# Patient Record
Sex: MALE | Race: WHITE | NOT HISPANIC OR LATINO | URBAN - METROPOLITAN AREA
[De-identification: names, ages, dates, MRNs, and addresses within clinical notes are randomized per-mention and may not be internally consistent; named-entity substitution may affect disease eponyms.]

---

## 2019-07-31 ENCOUNTER — OFFICE VISIT - HEALTHEAST (OUTPATIENT)
Dept: INTERNAL MEDICINE | Facility: CLINIC | Age: 24
End: 2019-07-31

## 2019-07-31 DIAGNOSIS — Z00.00 ANNUAL PHYSICAL EXAM: ICD-10-CM

## 2019-07-31 DIAGNOSIS — F32.A DEPRESSION, UNSPECIFIED DEPRESSION TYPE: ICD-10-CM

## 2019-07-31 DIAGNOSIS — F41.9 ANXIETY: ICD-10-CM

## 2019-07-31 DIAGNOSIS — Z11.3 SCREEN FOR STD (SEXUALLY TRANSMITTED DISEASE): ICD-10-CM

## 2019-07-31 DIAGNOSIS — R53.83 FATIGUE, UNSPECIFIED TYPE: ICD-10-CM

## 2019-07-31 LAB
ALBUMIN SERPL-MCNC: 4.5 G/DL (ref 3.5–5)
ALP SERPL-CCNC: 94 U/L (ref 45–120)
ALT SERPL W P-5'-P-CCNC: 26 U/L (ref 0–45)
ANION GAP SERPL CALCULATED.3IONS-SCNC: 9 MMOL/L (ref 5–18)
AST SERPL W P-5'-P-CCNC: 17 U/L (ref 0–40)
BASOPHILS # BLD AUTO: 0.1 THOU/UL (ref 0–0.2)
BASOPHILS NFR BLD AUTO: 1 % (ref 0–2)
BILIRUB SERPL-MCNC: 0.4 MG/DL (ref 0–1)
BUN SERPL-MCNC: 13 MG/DL (ref 8–22)
CALCIUM SERPL-MCNC: 9.7 MG/DL (ref 8.5–10.5)
CHLORIDE BLD-SCNC: 107 MMOL/L (ref 98–107)
CHOLEST SERPL-MCNC: 209 MG/DL
CO2 SERPL-SCNC: 25 MMOL/L (ref 22–31)
CREAT SERPL-MCNC: 0.83 MG/DL (ref 0.7–1.3)
EOSINOPHIL # BLD AUTO: 0.6 THOU/UL (ref 0–0.4)
EOSINOPHIL NFR BLD AUTO: 5 % (ref 0–6)
ERYTHROCYTE [DISTWIDTH] IN BLOOD BY AUTOMATED COUNT: 11.1 % (ref 11–14.5)
FASTING STATUS PATIENT QL REPORTED: NO
GFR SERPL CREATININE-BSD FRML MDRD: >60 ML/MIN/1.73M2
GLUCOSE BLD-MCNC: 84 MG/DL (ref 70–125)
HCT VFR BLD AUTO: 42.8 % (ref 40–54)
HDLC SERPL-MCNC: 52 MG/DL
HGB BLD-MCNC: 14.5 G/DL (ref 14–18)
LDLC SERPL CALC-MCNC: 126 MG/DL
LYMPHOCYTES # BLD AUTO: 3 THOU/UL (ref 0.8–4.4)
LYMPHOCYTES NFR BLD AUTO: 26 % (ref 20–40)
MCH RBC QN AUTO: 31 PG (ref 27–34)
MCHC RBC AUTO-ENTMCNC: 33.8 G/DL (ref 32–36)
MCV RBC AUTO: 92 FL (ref 80–100)
MONOCYTES # BLD AUTO: 1 THOU/UL (ref 0–0.9)
MONOCYTES NFR BLD AUTO: 9 % (ref 2–10)
NEUTROPHILS # BLD AUTO: 6.7 THOU/UL (ref 2–7.7)
NEUTROPHILS NFR BLD AUTO: 60 % (ref 50–70)
PLATELET # BLD AUTO: 305 THOU/UL (ref 140–440)
PMV BLD AUTO: 7 FL (ref 7–10)
POTASSIUM BLD-SCNC: 4 MMOL/L (ref 3.5–5)
PROT SERPL-MCNC: 7.2 G/DL (ref 6–8)
RBC # BLD AUTO: 4.67 MILL/UL (ref 4.4–6.2)
SODIUM SERPL-SCNC: 141 MMOL/L (ref 136–145)
TRIGL SERPL-MCNC: 157 MG/DL
TSH SERPL DL<=0.005 MIU/L-ACNC: 0.69 UIU/ML (ref 0.3–5)
WBC: 11.3 THOU/UL (ref 4–11)

## 2019-07-31 ASSESSMENT — MIFFLIN-ST. JEOR: SCORE: 1668.24

## 2019-08-14 ENCOUNTER — OFFICE VISIT - HEALTHEAST (OUTPATIENT)
Dept: INTERNAL MEDICINE | Facility: CLINIC | Age: 24
End: 2019-08-14

## 2019-08-14 DIAGNOSIS — F41.9 ANXIETY: ICD-10-CM

## 2019-08-14 DIAGNOSIS — F32.A DEPRESSION, UNSPECIFIED DEPRESSION TYPE: ICD-10-CM

## 2019-08-14 ASSESSMENT — MIFFLIN-ST. JEOR: SCORE: 1654.64

## 2019-09-10 ENCOUNTER — COMMUNICATION - HEALTHEAST (OUTPATIENT)
Dept: INTERNAL MEDICINE | Facility: CLINIC | Age: 24
End: 2019-09-10

## 2019-09-10 DIAGNOSIS — F41.9 ANXIETY: ICD-10-CM

## 2019-09-10 DIAGNOSIS — F32.A DEPRESSION, UNSPECIFIED DEPRESSION TYPE: ICD-10-CM

## 2019-09-11 ENCOUNTER — COMMUNICATION - HEALTHEAST (OUTPATIENT)
Dept: INTERNAL MEDICINE | Facility: CLINIC | Age: 24
End: 2019-09-11

## 2019-09-20 ASSESSMENT — ANXIETY QUESTIONNAIRES
4. TROUBLE RELAXING: NOT AT ALL
2. NOT BEING ABLE TO STOP OR CONTROL WORRYING: NOT AT ALL
GAD7 TOTAL SCORE: 3
5. BEING SO RESTLESS THAT IT IS HARD TO SIT STILL: NOT AT ALL
3. WORRYING TOO MUCH ABOUT DIFFERENT THINGS: SEVERAL DAYS
6. BECOMING EASILY ANNOYED OR IRRITABLE: SEVERAL DAYS
1. FEELING NERVOUS, ANXIOUS, OR ON EDGE: SEVERAL DAYS
7. FEELING AFRAID AS IF SOMETHING AWFUL MIGHT HAPPEN: NOT AT ALL

## 2019-09-20 ASSESSMENT — PATIENT HEALTH QUESTIONNAIRE - PHQ9: SUM OF ALL RESPONSES TO PHQ QUESTIONS 1-9: 2

## 2020-10-20 ENCOUNTER — COMMUNICATION - HEALTHEAST (OUTPATIENT)
Dept: INTERNAL MEDICINE | Facility: CLINIC | Age: 25
End: 2020-10-20

## 2020-10-21 ENCOUNTER — OFFICE VISIT - HEALTHEAST (OUTPATIENT)
Dept: INTERNAL MEDICINE | Facility: CLINIC | Age: 25
End: 2020-10-21

## 2020-10-21 ENCOUNTER — COMMUNICATION - HEALTHEAST (OUTPATIENT)
Dept: INTERNAL MEDICINE | Facility: CLINIC | Age: 25
End: 2020-10-21

## 2020-10-21 DIAGNOSIS — Z87.891 HISTORY OF SMOKING: ICD-10-CM

## 2020-10-21 ASSESSMENT — PATIENT HEALTH QUESTIONNAIRE - PHQ9: SUM OF ALL RESPONSES TO PHQ QUESTIONS 1-9: 3

## 2020-10-23 ENCOUNTER — COMMUNICATION - HEALTHEAST (OUTPATIENT)
Dept: SCHEDULING | Facility: CLINIC | Age: 25
End: 2020-10-23

## 2021-05-26 ASSESSMENT — PATIENT HEALTH QUESTIONNAIRE - PHQ9: SUM OF ALL RESPONSES TO PHQ QUESTIONS 1-9: 2

## 2021-05-27 ASSESSMENT — PATIENT HEALTH QUESTIONNAIRE - PHQ9: SUM OF ALL RESPONSES TO PHQ QUESTIONS 1-9: 3

## 2021-05-28 ASSESSMENT — ANXIETY QUESTIONNAIRES: GAD7 TOTAL SCORE: 3

## 2021-05-31 NOTE — PROGRESS NOTES
Assessment/Plan:     1. Annual physical exam  General exam for healthy male follow-up physical one year  - Comprehensive Metabolic Panel  - HM1(CBC and Differential)  - HM1 (CBC with Diff)  - Lipid Cascade RANDOM    2. Anxiety  3. Depression, unspecified depression type  Currently not under good control recommend increase Zoloft to 100 mg daily follow-up 2 weeks  - sertraline (ZOLOFT) 100 MG tablet; Take 1 tablet (100 mg total) by mouth daily.  Dispense: 30 tablet; Refill: 0  Patient is also provided a note indicating that he would benefit from an emotional support animal.    4. Fatigue, unspecified type  - Thyroid Stimulating Hormone (TSH)    5. Screen for STD (sexually transmitted disease)  - Chlamydia trachomatis & Neisseria gonorrhoeae, Amplified Detection       I recommended he eat a healthy diet and exercise on a regular basis.      Subjective:     David Varela is a 24 y.o. male who presents for an annual exam. Patient requesting refill of Sertraline.  50 mg daily.  Started taking fall 2016.  PHQ-9 today is 4, GAD7 6. Hx of anxiety and depression started in high school, symptoms worsened while at college. He previously attend therapy and felt it did help.  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    Patient was no additional concerns.    The patient reports that there is not domestic violence in his life.     Healthy Habits:   Regular Exercise: Yes  Sunscreen Use: Yes  Healthy Diet: Yes  Dental Visits Regularly: Yes  Sexually active: Yes  Colonoscopy: No      Immunization History   Administered Date(s) Administered     DT (pediatric) 1995, 1995, 08/20/1997     DTP / HiB 1996     Dtap 08/15/2001     Hep B, Adult 1996, 1996, 05/12/1997     Hepatitis A, Ped/Adol 2 Dose IM (18yr & under) 02/07/2008, 08/11/2009      Hib (PRP-T) 1996, 1996, 08/20/1997     IPV 08/15/2001     MMR 08/20/1997, 08/15/2001     Meningococcal MCV4P 12/13/2012     Meningococcal MPSV4, SQ 08/11/2009     OPV,Trivalent,Historic(4706-6795 only) 1996, 1996, 1996     Td,adult,historic,unspecified 12/23/2018     Tdap 02/07/2008     Varicella 02/21/2001, 02/07/2008     Immunization status: up to date and documented.        Current Outpatient Medications   Medication Sig Dispense Refill     sertraline (ZOLOFT) 100 MG tablet Take 1 tablet (100 mg total) by mouth daily. 30 tablet 0     No current facility-administered medications for this visit.      Past Medical History:   Diagnosis Date     Anxiety      Depression      Past Surgical History:   Procedure Laterality Date     WISDOM TOOTH EXTRACTION       Patient has no known allergies.  Family History   Problem Relation Age of Onset     No Medical Problems Mother      No Medical Problems Father      No Medical Problems Sister      Social History     Socioeconomic History     Marital status: Single     Spouse name: Not on file     Number of children: Not on file     Years of education: Not on file     Highest education level: Not on file   Occupational History     Not on file   Social Needs     Financial resource strain: Not on file     Food insecurity:     Worry: Not on file     Inability: Not on file     Transportation needs:     Medical: Not on file     Non-medical: Not on file   Tobacco Use     Smoking status: Current Every Day Smoker     Smokeless tobacco: Never Used     Tobacco comment: E-cig   Substance and Sexual Activity     Alcohol use: Yes     Alcohol/week: 1.8 oz     Types: 3 Cans of beer per week     Frequency: 2-4 times a month     Drinks per session: 3 or 4     Binge frequency: Monthly     Drug use: Never     Sexual activity: Yes     Partners: Female     Birth control/protection: Implant   Lifestyle     Physical activity:     Days per week: Not on file     Minutes per  "session: Not on file     Stress: Not on file   Relationships     Social connections:     Talks on phone: Not on file     Gets together: Not on file     Attends Jain service: Not on file     Active member of club or organization: Not on file     Attends meetings of clubs or organizations: Not on file     Relationship status: Not on file     Intimate partner violence:     Fear of current or ex partner: Not on file     Emotionally abused: Not on file     Physically abused: Not on file     Forced sexual activity: Not on file   Other Topics Concern     Not on file   Social History Narrative     Not on file       Review of Systems  General:  Negative except as noted above  Eyes: Negative except as noted above  Ears/Nose/Throat: Negative except as noted above  Cardiovascular: Negative except as noted above  Respiratory:  Negative except as noted above  Gastrointestinal:  Negative except as noted above  Musculoskeletal:  Negative except as noted above  Skin: Negative except as noted above  Neurologic: Negative except as noted above  Psychiatric: Negative except as noted above  Endocrine: Negative except as noted above  Heme/Lymphatic: Negative except as noted above   Allergic/Immunologic: Negative except as noted above      Objective:      Vitals:    07/31/19 1640   BP: 138/82   Pulse: (!) 102   Resp: 24   Temp: 98.3  F (36.8  C)   SpO2: 96%   Weight: 158 lb (71.7 kg)   Height: 5' 7.5\" (1.715 m)     Wt Readings from Last 3 Encounters:   07/31/19 158 lb (71.7 kg)     Body mass index is 24.38 kg/m . (>25?)    Physical Exam:  Constitutional: Well developed, well nourished, no acute distress.  HEENT: normocephalic/atraumatic, PERRLA/EOMI, TMs: Gray, normal light reflex, no nasal discharge.  Oral mucosa: moist; no erythema/exudate  Neck: No LAD/masses/thyromegaly  Lungs: clear bilaterally  Heart: regular rate and rhythm, no murmurs/gallops/rubs  Abdomen: Normal bowel sounds, soft, non-tender, non-distended, no masses, neg " Brannon's/McBurney's, no rebound/guarding  Lymphatics: no supraclavicular/axillary/epitrochlear/inguinal LAD. No edema.  Neuro: A&O x 3, CN II-XII intact, strength 5/5, reflexes symmetric, sensory intact to light touch.  Musculoskeletal: no gross deformities.  Skin: no rashes or lesions.  Psych: Behavior appropriate, engaging.  Thought processes congruent, non-tangential

## 2021-05-31 NOTE — PATIENT INSTRUCTIONS - HE
Tonight 8.14.19    Reduce Zoloft to 50mg daily for 1 week  Start Wellbutrin 150mg daily    After 1 week you may discontinue Zoloft and continue Wellbutrin daily.    If you are feeling more anxious or down, you may continue to taper Zoloft 50mg every other day for an additional week, then discontinue.

## 2021-05-31 NOTE — PROGRESS NOTES
Internal Medicine Office Visit  [facility]  Patient Name: David Varela  Patient Age: 24 y.o.  YOB: 1995  MRN: [id number]    Date of Visit: 2019  Reason for Office Visit:   Chief Complaint   Patient presents with     Anxiety     Patient states that he is doing well and that the medication is working.            Assessment / Plan / Medical Decision Makin. Anxiety  - buPROPion (WELLBUTRIN XL) 150 MG 24 hr tablet; Take 1 tablet (150 mg total) by mouth every morning.  Dispense: 30 tablet; Refill: 0    2. Depression, unspecified depression type  - buPROPion (WELLBUTRIN XL) 150 MG 24 hr tablet; Take 1 tablet (150 mg total) by mouth every morning.  Dispense: 30 tablet; Refill: 0    Patient will reduce his Zoloft to 50 mg daily for 1 week    He will start the Wellbutrin 150 mg today    After 1 week patient will discontinue the Zoloft and continue 150 mg of Wellbutrin.    No orders of the defined types were placed in this encounter.  Followup: Return in about 2 weeks (around 2019). earlier if needed.    Health Maintenance Review  Health Maintenance   Topic Date Due     DEPRESSION FOLLOW UP  1996     HIV SCREENING  2011     ADVANCE CARE PLANNING  2014     INFLUENZA VACCINE RULE BASED (1) 2019     PREVENTIVE CARE VISIT  2020     TD 18+ HE  2028     TDAP ADULT ONE TIME DOSE  Completed     HPV VACCINES  Aged Out         I am having David Varela start on buPROPion. I am also having him maintain his sertraline.      HPI:  David Varela is a 24 y.o. year old who presents to the office today  For follow-up of anxiety and depression..  Patient had reported his last clinic visit on 2019 that his anxiety and depression were not under good control subsequent increase of his Zoloft 100 mg daily with recommendation to follow-up in 2 weeks.  Patient was also provided a note indicating he would benefit from an emotional support animal.   He did reported some sexual dysfunction with the increase in the Zoloft he is interested in transitioning to medication with less sexual side effects.    Review of Systems- pertinent positive in bold:  Constitutional: Fever, chills, night sweats, fainting, weight change, fatigue, dizziness, sleeping difficulties, loud snoring/pauses in breathing  Eyes: change in vision, blurred or double vision, redness/eye pain  Ears, nose, mouth, throat: change in hearing, ear pain, hoarseness, difficulty swallowing, sores in the mouth or throat  Respiratory: shortness of breath, cough, bloody sputum, wheezing  Cardiovascular: chest pain, palpitations   Gastrointestinal: abdominal pain, heartburn/indigestion, nausea/vomiting, change in appetite, change in bowel habits, constipation or diarrhea, rectal bleeding/dark stools, difficulty swallowing  Urinary: painful urination, frequent urination, urinary urgency/incontinence, blood in urine/dark urine, nocturia (new or increasing), muscle cramps, swelling of hands, feet, ankles, leg pain/redness  Skin: change in moles/freckles, rash, nodules  Hematologic/lymphatic: swollen lymph glands, abnormal bruising/bleeding  Endocrine: excessive thirst/urination, cold or heat intolerance  Neurologic/emotional: worrisome memory change, numbness/tingling, anxiety, mood swings      Current Scheduled Meds:  Outpatient Encounter Medications as of 8/14/2019   Medication Sig Dispense Refill     sertraline (ZOLOFT) 100 MG tablet Take 1 tablet (100 mg total) by mouth daily. 30 tablet 0     buPROPion (WELLBUTRIN XL) 150 MG 24 hr tablet Take 1 tablet (150 mg total) by mouth every morning. 30 tablet 0     No facility-administered encounter medications on file as of 8/14/2019.      Past Medical History:   Diagnosis Date     Anxiety      Depression      Past Surgical History:   Procedure Laterality Date     WISDOM TOOTH EXTRACTION       Social History     Tobacco Use     Smoking status: Current Every Day  "Smoker     Smokeless tobacco: Never Used     Tobacco comment: E-cig   Substance Use Topics     Alcohol use: Yes     Alcohol/week: 1.8 oz     Types: 3 Cans of beer per week     Frequency: 2-4 times a month     Drinks per session: 3 or 4     Binge frequency: Monthly     Drug use: Never     Family History   Problem Relation Age of Onset     No Medical Problems Mother      No Medical Problems Father      No Medical Problems Sister      Objective / Physical Examination:  Vitals:    08/14/19 1637   BP: 122/82   Pulse: 96   Resp: 24   Temp: 98.1  F (36.7  C)   SpO2: 98%   Weight: 155 lb (70.3 kg)   Height: 5' 7.5\" (1.715 m)     Wt Readings from Last 3 Encounters:   08/14/19 155 lb (70.3 kg)   07/31/19 158 lb (71.7 kg)     Body mass index is 23.92 kg/m .     General Appearance: Alert and oriented, cooperative, affect appropriate, speech clear, in no apparent distress  Head: Normocephalic, atraumatic  Eyes:  Conjunctivae clear and sclerae non-icteric  Throat: Lips and mucosa moist.   Lungs:  Normal inspiratory and expiratory effort  Neuro: Alert and oriented, follows commands appropriately.       Little interest or pleasure in doing things: Not at all  Feeling down, depressed, or hopeless: Several days  Trouble falling or staying asleep, or sleeping too much: Not at all  Feeling tired or having little energy: Several days  Poor appetite or overeating: Not at all  Feeling bad about yourself - or that you are a failure or have let yourself or your family down: Not at all  Trouble concentrating on things, such as reading the newspaper or watching television: Not at all  Moving or speaking so slowly that other people could have noticed. Or the opposite - being so fidgety or restless that you have been moving around a lot more than usual: Not at all  Thoughts that you would be better off dead, or of hurting yourself in some way: Not at all  PHQ-9 Total Score: 2  If you checked off any problems, how difficult have these problems " made it for you to do your work, take care of things at home, or get along with other people?: Somewhat difficult     No data recorded      Isaura Baez, CNP

## 2021-06-03 VITALS — BODY MASS INDEX: 23.49 KG/M2 | HEIGHT: 68 IN | WEIGHT: 155 LBS

## 2021-06-03 VITALS — WEIGHT: 158 LBS | BODY MASS INDEX: 23.95 KG/M2 | HEIGHT: 68 IN

## 2021-06-12 NOTE — TELEPHONE ENCOUNTER
Pt stated he did received the requested letter from Isaura Baez CNP via e-mail. Pt reported he was unable to get it before via my chart, however verbalized receiving it now via email.      Jose M Lenz RN

## 2021-06-12 NOTE — TELEPHONE ENCOUNTER
Who is requesting the letter?  Patient   Why is the letter needed? For employer  How would you like this letter returned? I need a letter stating I am taking OTC nicorette gum to stop smoking and this is approved by her. Please release this to [employer].   Okay to leave a detailed message? Yes

## 2021-06-12 NOTE — TELEPHONE ENCOUNTER
Is PCP okay with our teaming sending a letter to patient with approval from PCP on taking nicorette gum?

## 2021-06-12 NOTE — TELEPHONE ENCOUNTER
Who is calling:  The patient   Reason for Call:  The patient is reporting he still cannot find the letter in My chart as indicated. Please resend to my chart. The patient is requesting a call.  Date of last appointment with primary care: 10/21/20  Okay to leave a detailed message: Yes

## 2021-06-19 NOTE — LETTER
Letter by Isaura Baez CNP at      Author: Isaura Baez CNP Service: -- Author Type: --    Filed:  Encounter Date: 7/31/2019 Status: (Other)         July 31, 2019     Patient: David Varela   YOB: 1995   Date of Visit: 7/31/2019       To Whom It May Concern:    It is my medical opinion that David Varela would benefit from having an emotional support animal.    If you have any questions or concerns, please don't hesitate to call.    Sincerely,        Electronically signed by Isaura Baez CNP

## 2021-06-21 NOTE — LETTER
Letter by Isaura Baez CNP at      Author: Isaura Baez CNP Service: -- Author Type: --    Filed:  Encounter Date: 10/21/2020 Status: (Other)           October 21, 2020     Patient: David Varela   YOB: 1995   Date of Visit: 10/21/2020       To Whom It May Concern:    It is my medical opinion that David Varela has recently discontinued the utilization of nicotine containing products and continues to due well without reoccurrence of use. His employment screening may be positive for nicotine given his recent discontinuation without concern for current or continued use of nicotine containing products.       If you have any questions or concerns, please don't hesitate to call.    Sincerely,        Electronically signed by Isaura Baez CNP

## 2021-06-26 ENCOUNTER — HEALTH MAINTENANCE LETTER (OUTPATIENT)
Age: 26
End: 2021-06-26

## 2021-06-29 NOTE — PROGRESS NOTES
"Progress Notes by Isaura Baez CNP at 10/21/2020 12:00 PM     Author: Isaura Baez CNP Service: -- Author Type: Nurse Practitioner    Filed: 10/21/2020 12:17 PM Encounter Date: 10/21/2020 Status: Signed    : Isaura Baez CNP (Nurse Practitioner)       David Varela is a 25 y.o. male who is being evaluated via a billable telephone visit.      The patient has been notified of following:     \"This telephone visit will be conducted via a call between you and your physician/provider. We have found that certain health care needs can be provided without the need for a physical exam.  This service lets us provide the care you need with a short phone conversation.  If a prescription is necessary we can send it directly to your pharmacy.  If lab work is needed we can place an order for that and you can then stop by our lab to have the test done at a later time.    Telephone visits are billed at different rates depending on your insurance coverage. During this emergency period, for some insurers they may be billed the same as an in-person visit.  Please reach out to your insurance provider with any questions.    If during the course of the call the physician/provider feels a telephone visit is not appropriate, you will not be charged for this service.\"    Patient has given verbal consent to a Telephone visit? Yes    What phone number would you like to be contacted at? [phone]    Patient would like to receive their AVS by AVS Preference: [place].    Additional provider notes:          [facility]  Patient Name: David Varela  Patient Age: 25 y.o.  YOB: 1995  MRN: [id number]    Date of Visit: 10/21/2020  Reason for TeleVisit:   Chief Complaint   Patient presents with   ? Follow-up     on quiting nicitine-got a job offer wants to getting off of it           Assessment / Plan / Medical Decision Makin. History of smoking  He is provided a letter for work " indicating that he has discontinued smoking and that his appointment screening may show positive nicotine with no concern for recurrence of utilization of nicotine-containing products.  Certainly his employer may request to have repeat screening an additional week if needed.  No orders of the defined types were placed in this encounter.  Followup: No follow-ups on file. earlier if needed.    Health Maintenance Review  Health Maintenance   Topic Date Due   ? Pneumococcal Vaccine: Pediatrics (0 to 5 Years) and At-Risk Patients (6 to 64 Years) (1 of 1 - PPSV23) 05/06/2002   ? HPV VACCINES (1 - Male 2-dose series) 05/06/2007   ? HIV SCREENING  05/06/2011   ? ADVANCE CARE PLANNING  05/06/2014   ? PREVENTIVE CARE VISIT  07/31/2020   ? INFLUENZA VACCINE RULE BASED (1) 08/01/2020   ? TD 18+ HE  12/23/2028   ? HEPATITIS B VACCINES  Completed   ? TDAP ADULT ONE TIME DOSE  Completed         I have discontinued David Varela's buPROPion.      HPI:  David Varela is a 25 y.o. year old who presents to Telehealth visit today with a history of anxiety and depression.  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  They will be doing a drug and nicotine screening.  He has been nicotine free for 5-7 days.  He is requesting a note from work to advise him of his recent discontinuation of smoking he is excited about this new opportunity and indicates that given its recent discontinuation may show up in the appointment screening.  He reports that he is substituting nicotine with gum and this is working well.  He denies any additional concerns.      Review of Systems- pertinent positive in bold:  Constitutional: Fever, chills, night sweats, fainting, weight change, fatigue, dizziness, sleeping difficulties, loud snoring/pauses in breathing  Eyes: change in vision, blurred or double vision, redness/eye pain  Ears, nose, mouth, throat: change in hearing, ear pain, hoarseness, difficulty swallowing, sores in the  mouth or throat  Respiratory: shortness of breath, cough, bloody sputum, wheezing  Cardiovascular: chest pain, palpitations   Gastrointestinal: abdominal pain, heartburn/indigestion, nausea/vomiting, change in appetite, change in bowel habits, constipation or diarrhea, rectal bleeding/dark stools, difficulty swallowing  Urinary: painful urination, frequent urination, urinary urgency/incontinence, blood in urine/dark urine, nocturia (new or increasing), muscle cramps, swelling of hands, feet, ankles, leg pain/redness  Skin: change in moles/freckles, rash, nodules  Hematologic/lymphatic: swollen lymph glands, abnormal bruising/bleeding  Endocrine: excessive thirst/urination, cold or heat intolerance  Neurologic/emotional: worrisome memory change, numbness/tingling, anxiety, mood swings      Current Scheduled Meds:  Outpatient Encounter Medications as of 10/21/2020   Medication Sig Dispense Refill   ? [DISCONTINUED] buPROPion (WELLBUTRIN XL) 150 MG 24 hr tablet Take 1 tablet (150 mg total) by mouth every morning. 30 tablet 5     No facility-administered encounter medications on file as of 10/21/2020.      Past Medical History:   Diagnosis Date   ? Anxiety    ? Depression      Past Surgical History:   Procedure Laterality Date   ? WISDOM TOOTH EXTRACTION       Social History     Tobacco Use   ? Smoking status: Current Every Day Smoker   ? Smokeless tobacco: Never Used   ? Tobacco comment: E-cig   Substance Use Topics   ? Alcohol use: Yes     Alcohol/week: 3.0 standard drinks     Types: 3 Cans of beer per week     Frequency: 2-4 times a month     Drinks per session: 3 or 4     Binge frequency: Monthly   ? Drug use: Never     Family History   Problem Relation Age of Onset   ? No Medical Problems Mother    ? No Medical Problems Father    ? No Medical Problems Sister      Social History     Social History Narrative   ? Not on file       Objective / Physical Examination:  There were no vitals filed for this visit.  Wt  Readings from Last 3 Encounters:   08/14/19 155 lb (70.3 kg)   07/31/19 158 lb (71.7 kg)     There is no height or weight on file to calculate BMI.     RESP: No audible wheeze, cough  PSYCH:  judgement and insight intact, normal speech       No results found.  No results found for this or any previous visit (from the past 240 hour(s)).  Diagnostics:     Results for orders placed or performed in visit on 07/31/19   Comprehensive Metabolic Panel   Result Value Ref Range    Sodium 141 136 - 145 mmol/L    Potassium 4.0 3.5 - 5.0 mmol/L    Chloride 107 98 - 107 mmol/L    CO2 25 22 - 31 mmol/L    Anion Gap, Calculation 9 5 - 18 mmol/L    Glucose 84 70 - 125 mg/dL    BUN 13 8 - 22 mg/dL    Creatinine 0.83 0.70 - 1.30 mg/dL    GFR MDRD Af Amer >60 >60 mL/min/1.73m2    GFR MDRD Non Af Amer >60 >60 mL/min/1.73m2    Bilirubin, Total 0.4 0.0 - 1.0 mg/dL    Calcium 9.7 8.5 - 10.5 mg/dL    Protein, Total 7.2 6.0 - 8.0 g/dL    Albumin 4.5 3.5 - 5.0 g/dL    Alkaline Phosphatase 94 45 - 120 U/L    AST 17 0 - 40 U/L    ALT 26 0 - 45 U/L   HM1 (CBC with Diff)   Result Value Ref Range    WBC 11.3 (H) 4.0 - 11.0 thou/uL    RBC 4.67 4.40 - 6.20 mill/uL    Hemoglobin 14.5 14.0 - 18.0 g/dL    Hematocrit 42.8 40.0 - 54.0 %    MCV 92 80 - 100 fL    MCH 31.0 27.0 - 34.0 pg    MCHC 33.8 32.0 - 36.0 g/dL    RDW 11.1 11.0 - 14.5 %    Platelets 305 140 - 440 thou/uL    MPV 7.0 7.0 - 10.0 fL    Neutrophils % 60 50 - 70 %    Lymphocytes % 26 20 - 40 %    Monocytes % 9 2 - 10 %    Eosinophils % 5 0 - 6 %    Basophils % 1 0 - 2 %    Neutrophils Absolute 6.7 2.0 - 7.7 thou/uL    Lymphocytes Absolute 3.0 0.8 - 4.4 thou/uL    Monocytes Absolute 1.0 (H) 0.0 - 0.9 thou/uL    Eosinophils Absolute 0.6 (H) 0.0 - 0.4 thou/uL    Basophils Absolute 0.1 0.0 - 0.2 thou/uL   Thyroid Stimulating Hormone (TSH)   Result Value Ref Range    TSH 0.69 0.30 - 5.00 uIU/mL   Lipid Cascade RANDOM   Result Value Ref Range    Cholesterol 209 (H) <=199 mg/dL    Triglycerides  157 (H) <=149 mg/dL    HDL Cholesterol 52 >=40 mg/dL    LDL Calculated 126 <=129 mg/dL    Patient Fasting > 8hrs? No        Quality review:     PHQ-2 Total Score: 0 (10/21/2020 11:00 AM)      PHQ-9 Total Score: 3 (10/21/2020 11:00 AM)        Isaura Baez CNP  Internal Medicine  [address] [address]      Phone call duration:  5  minutes    Isaura Baez CNP

## 2021-07-03 NOTE — ADDENDUM NOTE
Addendum Note by Carolina Guajardo MLT at 7/31/2019  5:00 PM     Author: Carolina Guajardo MLT Service: -- Author Type:     Filed: 7/31/2019  8:38 PM Encounter Date: 7/31/2019 Status: Signed    : Carolina Guajardo MLT ()    Addended by: CAROLINA GUAJARDO on: 7/31/2019 08:38 PM        Modules accepted: Orders

## 2021-10-16 ENCOUNTER — HEALTH MAINTENANCE LETTER (OUTPATIENT)
Age: 26
End: 2021-10-16

## 2022-07-23 ENCOUNTER — HEALTH MAINTENANCE LETTER (OUTPATIENT)
Age: 27
End: 2022-07-23

## 2022-10-01 ENCOUNTER — HEALTH MAINTENANCE LETTER (OUTPATIENT)
Age: 27
End: 2022-10-01

## 2023-08-06 ENCOUNTER — HEALTH MAINTENANCE LETTER (OUTPATIENT)
Age: 28
End: 2023-08-06

## 2023-12-12 ENCOUNTER — VIRTUAL VISIT (OUTPATIENT)
Dept: PSYCHIATRY | Facility: CLINIC | Age: 28
End: 2023-12-12
Payer: COMMERCIAL

## 2023-12-12 DIAGNOSIS — F32.A DEPRESSION, UNSPECIFIED DEPRESSION TYPE: Primary | ICD-10-CM

## 2023-12-12 RX ORDER — DULOXETIN HYDROCHLORIDE 60 MG/1
60 CAPSULE, DELAYED RELEASE ORAL DAILY
COMMUNITY

## 2023-12-12 RX ORDER — LAMOTRIGINE 150 MG/1
150 TABLET ORAL DAILY
COMMUNITY
Start: 2023-04-25

## 2023-12-12 RX ORDER — DULOXETIN HYDROCHLORIDE 30 MG/1
30 CAPSULE, DELAYED RELEASE ORAL DAILY
COMMUNITY
Start: 2023-07-20

## 2023-12-12 RX ORDER — LISDEXAMFETAMINE DIMESYLATE 40 MG/1
40 CAPSULE ORAL EVERY MORNING
COMMUNITY
Start: 2023-04-20

## 2023-12-12 ASSESSMENT — PAIN SCALES - GENERAL: PAINLEVEL: NO PAIN (0)

## 2023-12-12 NOTE — NURSING NOTE
Unable to complete PHQ-9 due to time restraint.     Is the patient currently in the state of [place]? YES    Visit mode:VIDEO    If the visit is dropped, the patient can be reconnected by: VIDEO VISIT: Send to e-mail at: [email or web address]    Will anyone else be joining the visit? NO  (If patient encounters technical issues they should call [phone]    How would you like to obtain your AVS? MyChart    Are changes needed to the allergy or medication list? No    Reason for visit: Consult    Medications and allergies have been reviewed.     Hebert BELL

## 2023-12-12 NOTE — PROGRESS NOTES
[facility] Treatment Resistant Depression Program  Diagnostic Assessment  A part of the [facility] Psychiatry Mood Disorders Program    David Varela MRN# [id number]   Age: 28 year old YOB: 1995     Date of Evaluation: 12/12/23  Start Time: 1:04; End Time: 1:59    Mode of communication: [employer] (HIPAA compliant, secure platform). Patient consented verbally to this mode of therapy today.  Reason for telehealth: COVID-19. This patient visit was converted to a telehealth visit to minimize exposure to COVID-19.    Originating Location (patient location): home, located in [place]  Distant Location (provider location):  office, located in [place], [place], using appropriate privacy considerations and procedures         Care Team     PCP- Isaura Baez  Specialty Providers- no  Therapist-  Ann Marie Giang MA, [facility]  Psychiatric Med Provider-  CLIFF Simon, at [facility]  Other Mental Health Providers- no    Referred by:  Self  Referred for evaluation of:  depression.         Contributors to the Assessment     Chart Reviewed.   Interview completed with David Varela.  Releases of information signed?  no  Collateral information obtained? no           Chief Complaint     Some improvement in depressive symptoms with therapy and medication, but symptoms have not resolved        Psychiatric History and Present Illness      David Varela is a 28 year old male who goes by David and uses he, him, his pronouns.    David reports one lifetime episode(s) of depression and has a history of no psychiatric hospitalization(s).     David's first episode of depression started at age 15. His first diagnosis and treatment was in 2017. David reports that since then there has not been a period of at least two months with full resolution of symptoms.    Current psychosocial stressors include mental health symptoms      David is interested in TMS and/or ketamine treatment.      Past diagnoses: MDD, anxiety, ADHD    Past  "medication trials: multiple trials    Hospitalizations: none    Commitment: No, Current [facility] order: No    ECT trials: No    TMS trials:  No      Ketamine:  No    Suicide attempts: No    Self-injurious behavior: No    Aggressive or violent behavior: No    Past Outpatient Programs & Services  Medication management and Outpatient individual psychotherapy    Psych critical item history mutiple psychotropic trials     Other mental health concerns discussed: anxiety, trauma, diana    Sleep study: none    ADHD testing: yes, takes Vyvanse which is helpful with attention and managing ruminations    Current Outpatient Programs & Services  Medication management and Outpatient individual psychotherapy         Psychiatric Review of Systems (Completed M.I.N.I. Version 7.0.2)     A. DEPRESSION  Past 2 Weeks:  low mood nearly every day, anhedonia most of the time, appetite change (unspecified), difficulties with sleep, psychomotor changes (agitation), low energy, and worthlessness and/or guilt    Past Episode:  low mood nearly every day, anhedonia most of the time, difficulties with sleep, psychomotor changes (agitation), low energy, worthlessness and/or guilt, and difficulty concentrating, thinking or making decisions    B. SUICIDALITY:  Risk: Low  Current suicidal ideation: No, denies a plan, and denies intent.     -reports 0% in response to \"How likely are to you to try to kill yourself within the next 3 months on a scale from 0-100%?\"  -denies current SIB/Self Injurious Behavior and denies past SIB    -reports no lifetime suicide attempts.  He has notable risk factors for self-harm including recent loss and male.  However, risk is mitigated by no h/o suicide attempt, no plan or intent, h/o seeking help when needed, future oriented, good social support  , stable housing, and good job situation.      C. DIANA/HYPOMANIA  Current Episode:  none    Past Episode:  elevated mood/energy, grandiosity, need less sleep, pressured " speech, racing thoughts, distractability , increased drive, and risk taking    David reports symptoms last for a few days up to a couple weeks. He started taking lamotrigine in June, 2023 which seems to have decreased these symptoms, though he experienced these symptoms a week or so ago for a few days.     D. PANIC:  provoked anxiety/fear    E. AGORAPHOBIA:  none    F. SOCIAL ANXIETY:   David endorsed the initial criteria for social anxiety disorder but none of the follow up questions    G. OBSESSIVE-COMPULSIVE:  obsessions    David reports intrusive thoughts about the same topic. He is able to used skills to reduce the thoughts.    H. TRAUMA:  experienced traumatic event, re-experienced trauma, and persistent avoidance of recollections of trauma    David reports that his mother's death  (14 months ago) was traumatic. He reports that his mother has alcohol abuse disorder when he was growing up and endorses hypervigilance, startling easily, having a parental role, etc. David notes that a past therapist diagnosed some kind of trauma disorder but he was unsure which specifically    I. ALCOHOL & J. NON-ALCOHOL:  See below    K. PSYCHOSIS:   paranoia - David reports worrying about friends not really liking him, not being genuine, etc. He notes this started when he was about 17 years old and he is now able to manage the thoughts. He reports that currently he is most likely to have these thoughts when he is transitioning from hypomania to depression    L-M. EATING DISORDER: none    N. GENERALIZED ANXIETY:  none    O. RULE OUT MEDICAL, ORGANIC OR DRUG CAUSES FOR ALL DISORDERS  During any current disorder or past mood episode, patient reports:  A. Substance use or withdrawal: No  B. Medical illness: No    P. ANTISOCIAL PERSONALITY:  none     Other Cluster B Traits Identified (not formally assessed):  none discussed    SUBSTANCE USE HISTORY                                                                 CAGE-AID    Have you felt  "you ought to cut down on your drinking or drug use? yes    Have people annoyed you by criticizing your drinking or drug use? no    Have you felt bad or guilty about your drinking or drug use? no    Have you ever had a drink or used drugs first thing in the morning to steady your nerves or to get rid of a hangover? no    CAGE-AID SCORE = 1      RECENT SUBSTANCE USE:     TOBACCO- e-cigarette  CAFFEINE-  1-2 coffee/day  ALCOHOL- rarely, less than 1/week     CANNABIS- \"on occasion\" takes CBD for relaxation and to \"help the vyvanse crash\"            OTHER ILLICIT DRUGS- none    Past Use-   TOBACCO- started cigarettes at 18  CAFFEINE-  1-2/day  ALCOHOL- age [age] drank a lot during a gap year, parents expressed concern - he cut back  CANNABIS- ages [age]            OTHER ILLICIT DRUGS- tried a few, but no regular use    CD Treatment history: No  Medical consequences due to use (eg HIV/Hepatitis)- No  Legal consequences due to use- No    SOCIAL and FAMILY HISTORY                                                             David Varela is a 28 year old single (never )  male with a psychiatric history of depression, hypomania, anxiety, ADHD who presents for a [facility] Treatment Resistant Depression program evaluation. David was referred by his own research.     Living situation: David lives alone in a Private Residence.   Kids? No  Pets? Yes - one cat named Anamaria  Guns, weapons, or other means to harm oneself in the home? 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 He is also close with his father and younger sister.    Spiritual considerations: " No    Legal History: No    Trauma/Abuse History: Yes      History: No    Family Mental Health History: mother - maybe depression, alcohol abuse/dependence, maternal side - depression    Strengths & Coping Strategies:  David is bright, personable, and easy to engage. He has done research about mental health and treatment, is engaged in his own care, and sees room for improvement so is seeking more options.     PAST PSYCH MED TRIALS      Will be reviewed during MTM.    MEDICAL / SURGICAL HISTORY                                   There is no problem list on file for this patient.      Past Surgical History:   Procedure Laterality Date    WISDOM TOOTH EXTRACTION          History of seizures: no   History of head trauma/loss of consciousness: no     ALLERGY                                Patient has no known allergies.    MEDICATIONS                               Current Outpatient Medications   Medication Sig Dispense Refill    DULoxetine (CYMBALTA) 30 MG capsule Take 30 mg by mouth daily      DULoxetine (CYMBALTA) 60 MG capsule Take 60 mg by mouth daily TAKE 1 CAPSULE BY MOUTH ONCE A DAY WITH ONE 30 MG CAPSULE FOR TOTAL DOSE OF 90 MG      lamoTRIgine (LAMICTAL) 150 MG tablet Take 150 mg by mouth daily      lisdexamfetamine (VYVANSE) 40 MG capsule Take 40 mg by mouth every morning         VITALS                                                                                                                            3, 3   There were no vitals taken for this visit.     MENTAL STATUS EXAM                                                                                    9, 14 cog gs     Alertness: alert  and oriented  Appearance: adequately groomed  Behavior/Demeanor: cooperative, pleasant, and calm, with good  eye contact   Speech: normal  Language: intact  Psychomotor: normal or unremarkable  Mood: description consistent with euthymia  Affect: full range; was congruent to mood; was congruent to content  Thought  Process/Associations: unremarkable  Thought Content:  Reports none;  Denies suicidal and violent ideation  Perception:  Reports none;  Denies auditory hallucinations and visual hallucinations  Insight: good  Judgment: good  Cognition: does appear grossly intact; formal cognitive testing was not done    PSYCHIATRIC DIAGNOSES                                                                                               Unspecified depressive disorder     ASSESSMENT                                                                                                          m2, h3     Please note, writer did not receive all pertinent medical records as of the time of this assessment. David did not sign KJ's for additional records.     Today, David presents as a Adequate historian with Good insight. David s past and present depressive symptoms seem consistent with a diagnosis of depression, but he also endorses symptoms of hypomania and has benefited from lamotrigine. Depressive symptoms seem to contribute to impaired functioning in the areas of family / partner relationships  and emotional wellbeing . Precipitating factors may include early onset of symptoms, family dynamics and history. Perpetuating factors may consist of multiple medication trials without remission.     Substance use does not seem to be a current problem.  David endorses symptoms of panic attacks, including depersonalization and derealization, that are almost always provoked/triggered. He also endorses intrusive thoughts and ruminations about a specific topic that he is able to managing using skills. Further diagnostic information is needed to clarify or rule out diagnosis(es) of bipolar disorder. David endorses all criteria for dilip on the MINI and reports that since starting lamotrigine in June, 2023 symptoms have decreased.  It is important to consider all symptoms in the context of David growing up in a home where one parent was active in alcohol  abuse/dependence throughout David's childhood and adolescence.     Basic needs (food, housing, transportation, safety, income stability): met    Today, based on all available evidence, he does not appear to be at imminent risk for self-harm therefore does not meet criteria for a 72-hr hold/  involuntary hospitalization.     Additional steps to minimize risk discussed, include: people to reach out to, providers to reach out to, crisis numbers and texts, and EmPATH.  24/7 crisis resources were provided verbally.     PLAN                                                                                                                        m2, h3   Patient will meet with TRD program PharmD and TRD program Psychiatrist to complete comprehensive multi-disciplinary assessment. Informed David that if appropriate for Interventional Psychiatry treatments, care will be provided with goal of stabilization with subsequent transfer back to the community (i.e. PCP or previous psychiatrist).    Medications: to be addressed during an MTM visit and new patient medication evaluation with a Psychiatrist    Therapy:  Yes - continue with current therapist.     Crisis Resources provided:  24/7 crisis resources including but not limited to the following:   - National Suicide Prevention Hotline: 5-559-387-TALK (2091)   - Crisis Text Line: Text START to 131-690   - Mental Health Emergency Number: 988   - EmPATH at [facility]    Other Referrals:  No    RTC: For MTM visit.    Fatimah Herman, [facility]             Virtual Visit Details    Type of service:  Video Visit     Originating Location (pt. Location): Home    Distant Location (provider location):  On-site  Platform used for Video Visit: Jyotsna

## 2024-01-05 ENCOUNTER — VIRTUAL VISIT (OUTPATIENT)
Dept: PSYCHIATRY | Facility: CLINIC | Age: 29
End: 2024-01-05
Payer: COMMERCIAL

## 2024-01-05 VITALS — BODY MASS INDEX: 22.22 KG/M2 | WEIGHT: 150 LBS | HEIGHT: 69 IN

## 2024-01-05 DIAGNOSIS — F32.A DEPRESSION, UNSPECIFIED DEPRESSION TYPE: Primary | ICD-10-CM

## 2024-01-05 RX ORDER — DEXTROAMPHETAMINE SACCHARATE, AMPHETAMINE ASPARTATE, DEXTROAMPHETAMINE SULFATE AND AMPHETAMINE SULFATE 2.5; 2.5; 2.5; 2.5 MG/1; MG/1; MG/1; MG/1
10 TABLET ORAL DAILY PRN
COMMUNITY
Start: 2023-12-05

## 2024-01-05 ASSESSMENT — PATIENT HEALTH QUESTIONNAIRE - PHQ9: SUM OF ALL RESPONSES TO PHQ QUESTIONS 1-9: 13

## 2024-01-05 ASSESSMENT — PAIN SCALES - GENERAL: PAINLEVEL: NO PAIN (0)

## 2024-01-05 NOTE — PROGRESS NOTES
"    Patient:  David Varela  :  1995   Age:  28 year old   Today's Video Visit:  2024    [facility]                                                              [address], [address]  [place], [place]  [address]       [facility] Treatment Resistant Depression (TRD) Program   Medication Therapy Management   Video Visit Note  This video visit is from my home to the patient's home via [employer]well to reduce exposure to COVID. We used [facility] in case we get disconnected, we will use patient  [facility] [phone].        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 Patient is seen on a video visit today for a [facility] TRD MTM Consultation.       Patient lives in [place], [place]                                                                                                       This patient is a new adult to the [facility] TRD MTM program.       Psychiatrist is: Dr. Darren Abrams who will see the patient on 2024 in clinic which was communicated to the patient                                                                                                                                        Chief Complaint                                   \" Depression, anxiety, rule out bipolar/hypomania, patient also has a diagnosis of ADHD and trauma \"      Reason for Consultation                                Rating medication trials for antidepressant failure and assessment of antidepressant drugs and their history.                                                  Informants include: Patient and review of past medical record. Please note that during the consultation I was not in the complete possession of all past medical records and psychiatrist medical " "records.     Pertinent Background  : [initial eval 01/05/24]     Mother's death 14 months ago was traumatic and his\" hypomania\"? went to depression.  Patient is close to his father and youngest sister.  Patient is interested in TMS or ketamine.  Patient was off all psychotropic medications approximately between early 2020 and 2023.    Psych pertinent item history includes trauma hx, substance use: alcohol, cannabis, and cocaine, acid, and ecstasy, and mutiple psychotropic trials     Medication Information                                                    Current Outpatient Medications   Medication Sig Dispense Refill    amphetamine-dextroamphetamine (ADDERALL) 10 MG tablet Take 10 mg by mouth daily as needed      DULoxetine (CYMBALTA) 30 MG capsule Take 30 mg by mouth daily      DULoxetine (CYMBALTA) 60 MG capsule Take 60 mg by mouth daily TAKE 1 CAPSULE BY MOUTH ONCE A DAY WITH ONE 30 MG CAPSULE FOR TOTAL DOSE OF 90 MG      lamoTRIgine (LAMICTAL) 150 MG tablet Take 150 mg by mouth daily      lisdexamfetamine (VYVANSE) 40 MG capsule Take 40 mg by mouth every morning           Current Psychotropic Medications:    Lamotrigine 150 mg at bedtime  Vyvanse/lisdexamfetamine 40 mg every morning  Adderall IR 10 mg daily as needed  Duloxetine/Cymbalta 90 mg every morning    Herbal Remedies:   Cannabis : CBD once to twice per week stopped using it because it was pricy    Vit B 12  Vit C  Vit D                                                                                                         Drug to Drug Interaction      Duloxetine and Vyvanse may result in increase amphetamine exposure and increased risk of serotonin syndrome    Current Reported Side Effects      Patient reports side effects to current psychiatric medications:  No   Gene testing:  Yes   Results:      ALLERGIES/SENSITIVITY     Patient has no known allergies.     MEDICAL HISTORY     There is no problem list on file for this patient.                         "      Psychiatric pertinent history                            Depression History  1. First time experienced:  At age Age 15 or 16 and diagnosed late 2016   2. First time Antidepressant Drug started:  At age 21. Drug: Sertraline   3. Last Episode started: Date: October 2023     4. Hospitalization for severe (SI) suicidal ideation or (SA) suicide attempt   No Date:   Comments: Patient's PHQ-9 was 13 and to me he denied today that he has any suicidal ideation currently.  He spent time with his family over the holidays and that was really helpful   5. Suicidal ideation current:  Denied to me  6. Therapist: Sees his therapist once per week and it helps  6. (CBT) Cognitive behavioral therapy  . Effective:     7. (DBT) Dialectical behavior therapy    . Effective:     8. Emergency Plan: Before the depression gets really severe patient will meditate, connect with friends talk to them he has 2-3 he can trust and can go to, and then he will talk to his dad..           Social and Environmental Aspects                          A. Living situation is: lives alone  B. Does patient have a pet  Yes. Pet cat  C. Marital Status: Single                                Pharmacotherapy Indicators: Pharmaceutical Aspects:       1. Economic Assessment: Has his health insurance and prescription coverage through his work  Prescription drug copayment is $ Manageable                                      The cost of obtaining prescribed medications: Does not interfere with compliance.   Comment:  2. Patient's Pharmacy: [facility]                                                            3. Compliance assessment shows that the patient is Independent in medication administration  4. Historian: the patient is a good historian  5. Pill box:  Patient uses a pillbox  c. The type of pillbox used is: Weekly   d. Misses Medications: adherent : Does not miss medication really very often                              Pharmacokinetic                  Habits and  Chemical Use  A. Alcohol: During his gap year at age [age] he drank a lot during that time but now only occasionally he will have a drink  B. Tobacco: Patient started to smoke at age 18 but currently he is only using e-cigarettes  C. Caffeine: 2 cups/day of coffee  D. Recreational Drugs: Cannabis was tried between the ages [age], he tried at age 21 3-4 times per month cocaine, MDMA sometimes, LSD 4-5 times between the age 18 and 20  CBD on occasion for relaxation to help him overcome the VGreenLightanse crash  E. Exercise: Patient does yoga from home he was a runner before, and he likes [employer]  F: Hobbies: Music, reading, being outside, movies, TV    Rating of Antidepressant Drugs:                 Off meds from early 2020 till early 2023 approximately            Selective serotonin reuptake inhibitor:   Sertraline/Zoloft from 2016 till 2019, max dose of 100 mg/day, response was minimal, side effects sexual performance issues, patient was numb and used recreational drugs during that time too    Serotonin - Noradrenaline  reuptake inhibitor:   Duloxetine/Cymbalta from March 2023 to present max dose 90 mg/day, this would give it a rating of 4, it helps somewhat with the grief of his  mother death; side effects grinding teeth    Serotonin Modulator and Stimulator:   Negative    Noradrenaline and Dopamine reuptake inhibitor:   Bupropion/Wellbutrin was tried in 2019 approximately for 5 months, max dose 150 mg/day response was ineffective not helpful    Tricyclic Antidepressants (TCA):   Negative    Tetracyclic Antidepressants:   Negative    Monoamine Oxidase Inhibitor (MAOI):   Negative    Augmentation/Bipolar Therapy:      Lamotrigine max dose 150 mg/day, was started in June 2023 and it appears it decreases his symptoms of hypomania ?       Miscellaneous Augmentation Therapy:      Antipsychotics:   Negative        Stimulants:   Adderall max dose 50 mg/day, patient was jittery on the higher dose and the crash was too bad  "afterwards  Lisdexamfetamine/Vyvanse max dose 40 mg/day is currently used and patient feels on it better according to him it is smoother and\" he does not feel like right running out of motor\"       Benzodiazepines:   Alprazolam/Xanax he tried it from a friend as a teenager and he did not care for it     Miscellaneous:   Omega-3 triglyceride/Fish oil patient is currently using it     Miscellaneous Sleep Aides:   Diphenhydramine was tried and he is groggy the next day  Melatonin was tried up to 10 mg at bedtime worked and then it felt like he built up tolerance to it     Ketamine Treatment:   Negative     Other Reported Treatment for Depression and Related Mood Disorder History:   Negative     Comments:   Patient might have some seasonal depression between December and March     Patient will follow with MTM as needed. All MTM findings will be shared with clinic psychiatrist. Patient was instructed to return for upcoming appointment with Dr. Darren Abrams for recommendations and future treatment options.       AMMY ROJAS, PHARMD    Pharmaceutical Care Coordinator   Time spent was 120 minutes without the patient and 42 minutes with the patient.        Virtual Visit Details    Type of service:  Video Visit     Originating Location (pt. Location): Home    Distant Location (provider location):  Off-site  Platform used for Video Visit: Jyotsna  "

## 2024-01-05 NOTE — NURSING NOTE
PHQ-9 score is 13 and #9 is 1, several days.     Is the patient currently in the state of [place]? YES    Visit mode:VIDEO    If the visit is dropped, the patient can be reconnected by: VIDEO VISIT: Send to e-mail at: [email or web address]    Will anyone else be joining the visit? NO  (If patient encounters technical issues they should call [phone])    How would you like to obtain your AVS? MyChart    Are changes needed to the allergy or medication list? No    Reason for visit: Consult    Medications and allergies have been reviewed.     Hebert King [facility]    Depression Response    Patient completed the PHQ-9 assessment for depression and scored >9? Yes  Question 9 on the PHQ-9 was positive for suicidality? Yes  Does patient have current mental health provider? Yes    Is this a virtual visit? Yes   Does patient have suicidal ideation (positive question 9)? Yes, pt has a current mental provider.     I personally notified the following: visit [id number] score placed in the message column for the provider.

## 2024-05-11 ENCOUNTER — HEALTH MAINTENANCE LETTER (OUTPATIENT)
Age: 29
End: 2024-05-11

## 2025-05-18 ENCOUNTER — HEALTH MAINTENANCE LETTER (OUTPATIENT)
Age: 30
End: 2025-05-18